# Patient Record
Sex: FEMALE | Race: WHITE | NOT HISPANIC OR LATINO | Employment: FULL TIME | URBAN - NONMETROPOLITAN AREA
[De-identification: names, ages, dates, MRNs, and addresses within clinical notes are randomized per-mention and may not be internally consistent; named-entity substitution may affect disease eponyms.]

---

## 2020-10-22 ENCOUNTER — OFFICE VISIT (OUTPATIENT)
Dept: FAMILY MEDICINE CLINIC | Facility: CLINIC | Age: 35
End: 2020-10-22

## 2020-10-22 VITALS
OXYGEN SATURATION: 99 % | DIASTOLIC BLOOD PRESSURE: 61 MMHG | HEART RATE: 89 BPM | TEMPERATURE: 98.7 F | SYSTOLIC BLOOD PRESSURE: 111 MMHG | BODY MASS INDEX: 24.55 KG/M2 | WEIGHT: 162 LBS | HEIGHT: 68 IN

## 2020-10-22 DIAGNOSIS — F32.A ANXIETY AND DEPRESSION: Primary | ICD-10-CM

## 2020-10-22 DIAGNOSIS — F41.9 ANXIETY AND DEPRESSION: Primary | ICD-10-CM

## 2020-10-22 DIAGNOSIS — Z76.0 ENCOUNTER FOR MEDICATION REFILL: ICD-10-CM

## 2020-10-22 PROCEDURE — 99204 OFFICE O/P NEW MOD 45 MIN: CPT | Performed by: PSYCHOLOGIST

## 2020-10-22 RX ORDER — VENLAFAXINE HYDROCHLORIDE 37.5 MG/1
75 CAPSULE, EXTENDED RELEASE ORAL DAILY
Qty: 60 CAPSULE | Refills: 2 | Status: SHIPPED | OUTPATIENT
Start: 2020-10-22 | End: 2020-11-16 | Stop reason: SDUPTHER

## 2020-11-16 ENCOUNTER — OFFICE VISIT (OUTPATIENT)
Dept: FAMILY MEDICINE CLINIC | Facility: CLINIC | Age: 35
End: 2020-11-16

## 2020-11-16 DIAGNOSIS — F32.A ANXIETY AND DEPRESSION: ICD-10-CM

## 2020-11-16 DIAGNOSIS — Z76.0 ENCOUNTER FOR MEDICATION REFILL: Primary | ICD-10-CM

## 2020-11-16 DIAGNOSIS — F41.9 ANXIETY AND DEPRESSION: ICD-10-CM

## 2020-11-16 RX ORDER — VENLAFAXINE HYDROCHLORIDE 150 MG/1
150 CAPSULE, EXTENDED RELEASE ORAL DAILY
Qty: 90 CAPSULE | Refills: 0 | Status: SHIPPED | OUTPATIENT
Start: 2020-11-16 | End: 2021-02-12 | Stop reason: SDUPTHER

## 2020-11-16 NOTE — PROGRESS NOTES
Subjective   Jeanie Boswell is a 35 y.o. female for a telephone visit today. She is running out of hr meds today and needs refill. She states current dose of 150 mg of Effexor are best for her.  The 75mg is not   working as well. She reports any other concerns today.     History of Present Illness     The following portions of the patient's history were reviewed and updated as appropriate: past family history, past medical history, past social history, past surgical history and problem list.    Review of Systems  See history of Present Illness     Objective     Virtual Visit Physical Exam    PHQ-2/PHQ-9 Depression Screening 10/22/2020   Little interest or pleasure in doing things 0   Feeling down, depressed, or hopeless 0   Total Score 0       Patient's There is no height or weight on file to calculate BMI. BMI is within normal parameters. No follow-up required.. This was based on her last office visit with us.    (Normal BMI:  18.5-24.9, OW 25-29.9, Obesity 30 or greater)      Assessment/Plan     Problems Addressed this Visit        Other    Anxiety and depression    Relevant Medications    venlafaxine XR (EFFEXOR-XR) 150 MG 24 hr capsule      Other Visit Diagnoses     Encounter for medication refill    -  Primary      Diagnoses       Codes Comments    Encounter for medication refill    -  Primary ICD-10-CM: Z76.0  ICD-9-CM: V68.1     Anxiety and depression     ICD-10-CM: F41.9, F32.9  ICD-9-CM: 300.00, 311           You have chosen to receive care through a telephone visit. Do you consent to use a telephone visit for your medical care today? Yes    This visit has been rescheduled as a phone visit to comply with patient safety concerns in accordance with CDC recommendations. Total time of discussion was 15 minutes.                 This document has been electronically signed by Leonardo Shaikh MD  November 16, 2020 16:20 EST    Part of this note may be an electronic transcription/translation of spoken  language to printed text using the Dragon Dictation System.

## 2020-12-21 NOTE — PROGRESS NOTES
Subjective   Jeanie Boswell is a 35 y.o. female who presents today for follow up she is needing a med refill on her Effexor 150mg qd.  She is tolerating this dose and has helped her a lot. She has lost weight too and less crying spells. No other c/o today. She seems to have a better disposition too with her job, her kids and her  who is undergoing new employment.  She is happy about that and no suicidal ideation.    Chief Complaint   Patient presents with   • Med Refill   • Anxiety   • Depression       History of Present Illness     The following portions of the patient's history were reviewed and updated as appropriate: allergies, current medications, past family history, past medical history, past social history, past surgical history and problem list.    Past Medical History:  Past Medical History:   Diagnosis Date   • Anxiety    • Depression    • GERD (gastroesophageal reflux disease)        Social History:  Social History     Socioeconomic History   • Marital status:      Spouse name: Not on file   • Number of children: Not on file   • Years of education: Not on file   • Highest education level: Not on file   Tobacco Use   • Smoking status: Never Smoker   • Smokeless tobacco: Never Used   Substance and Sexual Activity   • Alcohol use: No   • Drug use: No   • Sexual activity: Defer       Family History:  Family History   Problem Relation Age of Onset   • Hypertension Mother    • Hyperlipidemia Mother    • Heart disease Mother    • Hypertension Father    • Hyperlipidemia Father    • Heart attack Maternal Grandfather    • Heart attack Paternal Grandfather        Past Surgical History:  Past Surgical History:   Procedure Laterality Date   • CHOLECYSTECTOMY     • INNER EAR SURGERY     • TYMPANOPLASTY         Problem List:  Patient Active Problem List   Diagnosis   • Precordial pain   • Anxiety and depression       Allergy:   No Known Allergies     Current Medications:   Current Outpatient Medications   "  Medication Sig Dispense Refill   • APRI 0.15-30 MG-MCG per tablet daily.     • venlafaxine XR (EFFEXOR-XR) 37.5 MG 24 hr capsule Take 2 capsules by mouth Daily for 90 days. 60 capsule 2     No current facility-administered medications for this visit.        Objective     VITAL SIGNS:  /61 (BP Location: Right arm, Patient Position: Sitting, Cuff Size: Adult)   Pulse 89   Temp 98.7 °F (37.1 °C)   Ht 172.7 cm (68\")   Wt 73.5 kg (162 lb)   SpO2 99%   BMI 24.63 kg/m²     Review of Symptoms:    Review of Systems   Constitutional: Negative for chills, fatigue and fever.   HENT: Negative for congestion, ear discharge, ear pain, facial swelling, nosebleeds, postnasal drip, rhinorrhea, sinus pressure, sore throat, swollen glands and trouble swallowing.    Eyes: Negative for pain, discharge, redness and itching.   Respiratory: Negative for cough, chest tightness, shortness of breath and wheezing.    Cardiovascular: Negative for chest pain, palpitations and leg swelling.   Gastrointestinal: Negative for abdominal pain, anal bleeding, blood in stool, constipation, diarrhea, nausea, vomiting and GERD.   Endocrine: Negative for polydipsia and polyphagia.   Genitourinary: Negative for breast pain, decreased libido, difficulty urinating, dysuria, flank pain, frequency, hematuria, menstrual problem, pelvic pain, urgency, urinary incontinence, vaginal bleeding and vaginal discharge.   Musculoskeletal: Negative for arthralgias, back pain, joint swelling, myalgias and neck pain.   Skin: Negative for dry skin, pallor and rash.   Neurological: Negative for dizziness, syncope, speech difficulty, weakness, light-headedness, numbness, headache and confusion.   Hematological: Does not bruise/bleed easily.   Psychiatric/Behavioral: Negative for agitation, behavioral problems, dysphoric mood, self-injury, sleep disturbance, suicidal ideas, negative for hyperactivity and depressed mood.       PHYSICAL EXAM:  Physical Exam  Vitals " signs and nursing note reviewed.   Constitutional:       General: She is not in acute distress.     Appearance: Normal appearance. She is well-developed. She is not ill-appearing or diaphoretic.   HENT:      Head: Normocephalic and atraumatic.      Right Ear: Tympanic membrane and external ear normal. There is no impacted cerumen.      Left Ear: Tympanic membrane and external ear normal. There is no impacted cerumen.      Nose: Nose normal. No congestion or rhinorrhea.      Mouth/Throat:      Mouth: Mucous membranes are moist.      Pharynx: No oropharyngeal exudate or posterior oropharyngeal erythema.   Eyes:      General: Vision grossly intact. No scleral icterus.        Right eye: No discharge.         Left eye: No discharge.      Extraocular Movements: Extraocular movements intact.      Conjunctiva/sclera: Conjunctivae normal.      Pupils: Pupils are equal, round, and reactive to light.   Neck:      Musculoskeletal: Normal range of motion and neck supple. No neck rigidity.      Trachea: Trachea and phonation normal.   Cardiovascular:      Rate and Rhythm: Normal rate and regular rhythm.      Pulses: Normal pulses.      Heart sounds: Normal heart sounds. No murmur.   Pulmonary:      Effort: Pulmonary effort is normal. No accessory muscle usage or respiratory distress.      Breath sounds: Normal breath sounds. No stridor. No wheezing, rhonchi or rales.   Chest:      Chest wall: No tenderness or crepitus.      Breasts:         Right: Normal.         Left: Normal.   Abdominal:      General: Abdomen is flat. Bowel sounds are normal. There is no distension.      Palpations: Abdomen is soft.      Tenderness: There is no abdominal tenderness. There is no right CVA tenderness, left CVA tenderness or rebound.      Hernia: There is no hernia in the left inguinal area or right inguinal area.   Genitourinary:     General: Normal vulva.      Pubic Area: No rash or pubic lice.       Labia:         Right: No rash, tenderness or  lesion.         Left: No rash, tenderness or lesion.       Vagina: No vaginal discharge.      Rectum: Normal.   Musculoskeletal: Normal range of motion.         General: No swelling or tenderness.      Right lower leg: No edema.      Left lower leg: No edema.   Lymphadenopathy:      Cervical: No cervical adenopathy.   Skin:     General: Skin is warm.      Capillary Refill: Capillary refill takes less than 2 seconds.      Coloration: Skin is not pale.      Findings: No erythema, lesion or rash. Rash is not crusting, macular, nodular or papular.      Nails: There is no clubbing.     Neurological:      General: No focal deficit present.      Mental Status: She is alert and oriented to person, place, and time. Mental status is at baseline.      Cranial Nerves: Cranial nerves are intact.      Sensory: Sensation is intact.      Motor: Motor function is intact. No weakness.      Gait: Gait is intact. Gait normal.      Deep Tendon Reflexes: Reflexes are normal and symmetric.   Psychiatric:         Attention and Perception: Attention and perception normal.         Mood and Affect: Mood normal.         Speech: Speech normal.         Behavior: Behavior normal. Behavior is cooperative.         Thought Content: Thought content normal.         Cognition and Memory: Cognition and memory normal.         Judgment: Judgment normal.         Lab Results:   No visits with results within 1 Month(s) from this visit.   Latest known visit with results is:   Hospital Outpatient Visit on 07/20/2016   Component Date Value Ref Range Status   • Target HR (85%) 07/20/2016 162  bpm In process       Assessment/Plan     Problem List Items Addressed This Visit        Other    Anxiety and depression - Primary    Overview     She is currently being treated and she has days of fatigue. Overall she is doing great.          Relevant Medications    venlafaxine XR (EFFEXOR-XR) 37.5 MG 24 hr capsule      Other Visit Diagnoses     Encounter for medication  refill              PHQ-2/PHQ-9 Depression Screening 10/22/2020   Little interest or pleasure in doing things 0   Feeling down, depressed, or hopeless 0   Total Score 0       Patient's Body mass index is 24.63 kg/m². BMI is within normal parameters. No follow-up required..   (Normal BMI:  18.5-24.9, OW 25-29.9, Obesity 30 or greater)    Jeanie Boswell  reports that she has never smoked. She has never used smokeless tobacco.. I have educated her on the risk of diseases from using tobacco products such as cancer, COPD and heart disease.      The patient was also counseled during this visit regarding her anxiety and depression issues as noted above on her on her HPI she is tolerating and doing very well with the current medications.  She was also advised to continue her lifestyle modification with exercise and diet which helps her a lot and tolerating oral things right now with what has been going on with her with her family.  We also discussed anticipatory guidance with wearing helmets and seatbelts as she does like riding the bike with her with her family.      Visit Diagnoses:    ICD-10-CM ICD-9-CM   1. Anxiety and depression  F41.9 300.00    F32.9 311   2. Encounter for medication refill  Z76.0 V68.1         MEDICATION ISSUES:  Discussed medication options and treatment plan of prescribed medication as well as the risks, benefits, and side effects including potential falls, possible impaired driving and metabolic adversities among others. Patient is agreeable to call the office with any worsening of symptoms or onset of side effects. Patient is agreeable to call 911 or go to the nearest ER should he/she begin having SI/HI.     MEDS ORDERED DURING VISIT:  New Medications Ordered This Visit   Medications   • venlafaxine XR (EFFEXOR-XR) 37.5 MG 24 hr capsule     Sig: Take 2 capsules by mouth Daily for 90 days.     Dispense:  60 capsule     Refill:  2       FOLLOW UP:   Return in about 3 months (around 1/22/2021) for  Med refill for TH / telephone visit. . She will do a TH/telephone visit for med reills and RTC to the clinic for any acute medical illness.             This document has been electronically signed by Leonardo Shaikh MD   November 10, 2020 14:13 EST    Part of this note may be an electronic transcription/translation of spoken language to printed text using the Dragon Dictation System.   No indicators present

## 2021-02-12 ENCOUNTER — TELEMEDICINE (OUTPATIENT)
Dept: FAMILY MEDICINE CLINIC | Facility: CLINIC | Age: 36
End: 2021-02-12

## 2021-02-12 DIAGNOSIS — Z01.89 ENCOUNTER FOR LABORATORY TEST: ICD-10-CM

## 2021-02-12 DIAGNOSIS — F32.A ANXIETY AND DEPRESSION: Primary | ICD-10-CM

## 2021-02-12 DIAGNOSIS — F41.9 ANXIETY AND DEPRESSION: Primary | ICD-10-CM

## 2021-02-12 DIAGNOSIS — Z76.0 ENCOUNTER FOR MEDICATION REFILL: ICD-10-CM

## 2021-02-12 PROCEDURE — 99212 OFFICE O/P EST SF 10 MIN: CPT | Performed by: PSYCHOLOGIST

## 2021-02-12 RX ORDER — VENLAFAXINE HYDROCHLORIDE 150 MG/1
150 CAPSULE, EXTENDED RELEASE ORAL DAILY
Qty: 30 CAPSULE | Refills: 5 | Status: SHIPPED | OUTPATIENT
Start: 2021-02-12 | End: 2021-07-27 | Stop reason: SDUPTHER

## 2021-02-12 RX ORDER — VENLAFAXINE HYDROCHLORIDE 150 MG/1
150 CAPSULE, EXTENDED RELEASE ORAL DAILY
Qty: 90 CAPSULE | Refills: 0 | Status: CANCELLED | OUTPATIENT
Start: 2021-02-12 | End: 2021-05-13

## 2021-02-12 NOTE — TELEPHONE ENCOUNTER
Caller: Jeanie Boswell    Relationship: Self    Best call back number: [phone]    Medication needed:   Requested Prescriptions     Pending Prescriptions Disp Refills   • venlafaxine XR (EFFEXOR-XR) 150 MG 24 hr capsule 90 capsule 0     Sig: Take 1 capsule by mouth Daily for 90 days.       When do you need the refill by: ASAP    What details did the patient provide when requesting the medication:    Does the patient have less than a 3 day supply:  [x] Yes  [] No    What is the patient's preferred pharmacy: [facility] [address] - [phone]  - [phone]

## 2021-02-12 NOTE — PROGRESS NOTES
Subjective   Jeanie Boswell is a 35 y.o. female who is here today via telehealth video visit.  She is needing a refill of her medications for her anxiety and depression.  She states that the current medication is working really well for her.  She has no other concerns for me today that she wanted to address: Is regarding Pap smear done by [facility].  She reports that she does not understand what the report was and was explained to her as good as she would have liked to she still not understanding what is going on as she has been diagnosed with having HPV for Pap x2 her previous Pap.  And her last Pap smear they just told her that it was okay she was kind of concerned about that.  She denies any vaginal bleeding any abdominal pain fever.  Depression  Visit Type: follow-up  Patient presents with the following symptoms: depressed mood (occasional).  Patient is not experiencing: anhedonia, chest pain, choking sensation, compulsions, confusion, decreased concentration, fatigue, feelings of hopelessness, feelings of worthlessness, insomnia, irritability, nausea, palpitations, panic, suicidal ideas and suicidal planning.  Frequency of symptoms: rarely   Episode duration: 5 minutes  Severity: mild   Sleep per night: 8 hours  Sleep quality: good  Nighttime awakenings: none  Compliance with medications:  %  Side effects:  Dry mouth       The following portions of the patient's history were reviewed and updated as appropriate: past family history, past medical history, past social history and past surgical history.    Review of Systems   Constitutional: Negative for irritability.   Respiratory: Negative for choking.    Cardiovascular: Negative for palpitations.   Psychiatric/Behavioral: Negative for confusion, decreased concentration and suicidal ideas. The patient does not have insomnia.      See history of Present Illness     Objective     Virtual Visit Physical Exam    PHQ-2/PHQ-9 Depression  Screening 10/22/2020   Little interest or pleasure in doing things 0   Feeling down, depressed, or hopeless 0   Total Score 0       Patient's There is no height or weight on file to calculate BMI. BMI is within normal parameters. No follow-up required..  This is based on her last visit with us.   (Normal BMI:  18.5-24.9, OW 25-29.9, Obesity 30 or greater)      Assessment/Plan     Problems Addressed this Visit        Mental Health    Anxiety and depression - Primary     Patient's depression is single episode and is mild without psychosis. Their depression is currently in full remission and the condition is improving with treatment. This will be reassessed in 3 months. F/U as described:patient will continue current medication therapy and patient depression is being managed by their pcp.           Other Visit Diagnoses     Encounter for medication refill          Diagnoses       Codes Comments    Anxiety and depression    -  Primary ICD-10-CM: F41.9, F32.9  ICD-9-CM: 300.00, 311     Encounter for medication refill     ICD-10-CM: Z76.0  ICD-9-CM: V68.1           You have chosen to receive care through a telephone visit. Do you consent to use a telephone visit for your medical care today? Yes    This visit has been rescheduled as a phone visit to comply with patient safety concerns in accordance with CDC recommendations. Total time of discussion was 15 minutes.                 This document has been electronically signed by Leonardo Shaikh MD  February 12, 2021 13:15 EST    Part of this note may be an electronic transcription/translation of spoken language to printed text using the Dragon Dictation System.

## 2021-02-12 NOTE — ASSESSMENT & PLAN NOTE
Patient's depression is single episode and is mild without psychosis. Their depression is currently in full remission and the condition is improving with treatment. This will be reassessed in 3 months. F/U as described:patient will continue current medication therapy and patient depression is being managed by their pcp.

## 2021-07-27 ENCOUNTER — OFFICE VISIT (OUTPATIENT)
Dept: FAMILY MEDICINE CLINIC | Facility: CLINIC | Age: 36
End: 2021-07-27

## 2021-07-27 VITALS
HEART RATE: 98 BPM | DIASTOLIC BLOOD PRESSURE: 74 MMHG | WEIGHT: 184.8 LBS | OXYGEN SATURATION: 98 % | HEIGHT: 68 IN | RESPIRATION RATE: 18 BRPM | SYSTOLIC BLOOD PRESSURE: 112 MMHG | TEMPERATURE: 97.7 F | BODY MASS INDEX: 28.01 KG/M2

## 2021-07-27 DIAGNOSIS — F41.9 ANXIETY AND DEPRESSION: Primary | ICD-10-CM

## 2021-07-27 DIAGNOSIS — F32.A ANXIETY AND DEPRESSION: Primary | ICD-10-CM

## 2021-07-27 DIAGNOSIS — Z76.0 ENCOUNTER FOR MEDICATION REFILL: ICD-10-CM

## 2021-07-27 PROCEDURE — 99213 OFFICE O/P EST LOW 20 MIN: CPT | Performed by: PSYCHOLOGIST

## 2021-07-27 RX ORDER — VENLAFAXINE HYDROCHLORIDE 150 MG/1
150 CAPSULE, EXTENDED RELEASE ORAL DAILY
Qty: 30 CAPSULE | Refills: 6 | Status: SHIPPED | OUTPATIENT
Start: 2021-07-27 | End: 2022-02-22

## 2021-07-27 NOTE — PROGRESS NOTES
"Chief Complaint  Follow-up ( for med R/F on Effexor and Fasting Labs)    Subjective       Jeanie Boswell presents to [facility] for ff/up visit for chronic illness. Reviewed labs done and all are good, will get labs next OV, patient is not fasting. ( 05/18/2021:    Triglycerides 254, TC= 223. GLUC- 69, HDL -97, LDL -75)  Depression  Visit Type: follow-up  Patient is not experiencing: compulsions, decreased concentration, depressed mood, excessive worry, feelings of hopelessness, feelings of worthlessness, irritability, nervousness/anxiety, palpitations, panic, restlessness, shortness of breath and suicidal ideas.  Frequency of symptoms: rarely   Sleep per night: 7 hours  Sleep quality: poor  Nighttime awakenings: several  Compliance with medications:  %        Anxiety  Presents for follow-up visit. Patient reports no chest pain, compulsions, decreased concentration, depressed mood, excessive worry, irritability, nervous/anxious behavior, palpitations, panic, restlessness, shortness of breath or suicidal ideas. Symptoms occur rarely. The patient sleeps 7 hours per night. The quality of sleep is poor. Nighttime awakenings: several.     Her past medical history is significant for depression. Compliance with medications is %.       Objective   Vital Signs:   /74   Pulse 98   Temp 97.7 °F (36.5 °C) (Temporal)   Resp 18   Ht 172.7 cm (68\")   Wt 83.8 kg (184 lb 12.8 oz)   SpO2 98%   BMI 28.10 kg/m²     Physical Exam  Vitals and nursing note reviewed.   Constitutional:       General: She is not in acute distress.     Appearance: Normal appearance. She is well-developed. She is obese. She is not ill-appearing or diaphoretic.   HENT:      Head: Normocephalic and atraumatic.      Right Ear: Tympanic membrane and external ear normal. There is no impacted cerumen.      Left Ear: Tympanic membrane and external ear normal. There is no impacted cerumen.      Nose: Nose " normal. No congestion or rhinorrhea.      Mouth/Throat:      Mouth: Mucous membranes are moist.      Pharynx: No oropharyngeal exudate or posterior oropharyngeal erythema.   Eyes:      General: Vision grossly intact. No scleral icterus.        Right eye: No discharge.         Left eye: No discharge.      Extraocular Movements: Extraocular movements intact.      Conjunctiva/sclera: Conjunctivae normal.      Pupils: Pupils are equal, round, and reactive to light.   Neck:      Trachea: Trachea and phonation normal.   Cardiovascular:      Rate and Rhythm: Normal rate and regular rhythm.      Pulses: Normal pulses.      Heart sounds: Normal heart sounds. No murmur heard.     Pulmonary:      Effort: Pulmonary effort is normal. No accessory muscle usage or respiratory distress.      Breath sounds: Normal breath sounds. No stridor. No wheezing, rhonchi or rales.   Chest:      Chest wall: No tenderness or crepitus.      Breasts:         Right: Normal.         Left: Normal.   Abdominal:      General: Abdomen is protuberant. Bowel sounds are normal. There is no distension.      Palpations: Abdomen is soft.      Tenderness: There is no abdominal tenderness. There is no right CVA tenderness, left CVA tenderness or rebound.      Hernia: There is no hernia in the left inguinal area or right inguinal area.   Genitourinary:     General: Normal vulva.      Pubic Area: No rash or pubic lice.       Labia:         Right: No rash, tenderness or lesion.         Left: No rash, tenderness or lesion.       Vagina: No vaginal discharge.      Rectum: Normal.   Musculoskeletal:         General: No swelling or tenderness. Normal range of motion.      Cervical back: Normal range of motion and neck supple. No rigidity.      Right lower leg: No edema.      Left lower leg: No edema.   Lymphadenopathy:      Cervical: No cervical adenopathy.   Skin:     General: Skin is warm.      Capillary Refill: Capillary refill takes less than 2 seconds.       Coloration: Skin is not pale.      Findings: No erythema, lesion or rash. Rash is not crusting, macular, nodular or papular.      Nails: There is no clubbing.   Neurological:      General: No focal deficit present.      Mental Status: She is alert and oriented to person, place, and time. Mental status is at baseline.      Cranial Nerves: Cranial nerves are intact.      Sensory: Sensation is intact.      Motor: Motor function is intact. No weakness.      Gait: Gait is intact. Gait normal.      Deep Tendon Reflexes: Reflexes are normal and symmetric.   Psychiatric:         Attention and Perception: Attention and perception normal.         Mood and Affect: Mood normal.         Speech: Speech normal.         Behavior: Behavior normal. Behavior is cooperative.         Thought Content: Thought content normal.         Cognition and Memory: Cognition and memory normal.         Judgment: Judgment normal.        Result Review :   The following data was reviewed by: Leonardo Shaikh MD on 07/27/2021:  Outside labs done and reviewed 5/18/21.      Assessment and Plan    Diagnoses and all orders for this visit:    1. Anxiety and depression (Primary)  Assessment & Plan:  Patient's depression is single episode and is mild without psychosis. Their depression is currently in partial remission and the condition is improving with treatment. This will be reassessed at the next regular appointment. F/U as described:patient will continue current medication therapy.      2. Encounter for medication refill    Other orders  -     venlafaxine XR (EFFEXOR-XR) 150 MG 24 hr capsule; Take 1 capsule by mouth Daily for 210 days.  Dispense: 30 capsule; Refill: 6  -     Cancel: Tdap Vaccine Greater Than or Equal To 6yo IM    I spent 20 minutes caring for Jeanie on this date of service. This time includes time spent by me in the following activities:reviewing tests, performing a medically appropriate examination and/or evaluation , counseling  and educating the patient/family/caregiver, ordering medications, tests, or procedures and documenting information in the medical record     Follow Up   Return in about 6 months (around 1/27/2022) for Recheck, RTC FASTING LABS ( anxiety /depression).  Patient was given instructions and counseling regarding her condition or for health maintenance advice. Please see specific information pulled into the AVS if appropriate.         This document has been electronically signed by Leonardo Shaikh MD  July 27, 2021 15:34 EDT

## 2021-12-23 ENCOUNTER — TELEPHONE (OUTPATIENT)
Dept: FAMILY MEDICINE CLINIC | Facility: CLINIC | Age: 36
End: 2021-12-23

## 2021-12-23 NOTE — TELEPHONE ENCOUNTER
Attempted to reach pt to reschedule appt due to provider being out of office, pt did not answer, left vm.